# Patient Record
Sex: FEMALE | Race: WHITE | NOT HISPANIC OR LATINO
[De-identification: names, ages, dates, MRNs, and addresses within clinical notes are randomized per-mention and may not be internally consistent; named-entity substitution may affect disease eponyms.]

---

## 2019-06-20 PROBLEM — Z00.00 ENCOUNTER FOR PREVENTIVE HEALTH EXAMINATION: Status: ACTIVE | Noted: 2019-06-20

## 2019-07-30 ENCOUNTER — APPOINTMENT (OUTPATIENT)
Dept: ENDOCRINOLOGY | Facility: CLINIC | Age: 49
End: 2019-07-30
Payer: COMMERCIAL

## 2019-07-30 VITALS
HEIGHT: 65 IN | BODY MASS INDEX: 25.83 KG/M2 | WEIGHT: 155 LBS | HEART RATE: 81 BPM | DIASTOLIC BLOOD PRESSURE: 80 MMHG | SYSTOLIC BLOOD PRESSURE: 104 MMHG

## 2019-07-30 DIAGNOSIS — Z86.39 PERSONAL HISTORY OF OTHER ENDOCRINE, NUTRITIONAL AND METABOLIC DISEASE: ICD-10-CM

## 2019-07-30 DIAGNOSIS — Z83.49 FAMILY HISTORY OF OTHER ENDOCRINE, NUTRITIONAL AND METABOLIC DISEASES: ICD-10-CM

## 2019-07-30 DIAGNOSIS — Z78.9 OTHER SPECIFIED HEALTH STATUS: ICD-10-CM

## 2019-07-30 DIAGNOSIS — E05.20 THYROTOXICOSIS WITH TOXIC MULTINODULAR GOITER W/OUT THYROTOXIC CRISIS OR STORM: ICD-10-CM

## 2019-07-30 PROCEDURE — 99204 OFFICE O/P NEW MOD 45 MIN: CPT

## 2019-07-30 NOTE — PHYSICAL EXAM
[Well Nourished] : well nourished [No Proptosis] : no proptosis [EOMI] : extra ocular movement intact [Well Developed] : well developed [No Accessory Muscle Use] : no accessory muscle use [Clear to Auscultation] : lungs were clear to auscultation bilaterally [Normal S1, S2] : normal S1 and S2 [Regular Rhythm] : with a regular rhythm [No Clubbing, Cyanosis] : no clubbing  or cyanosis of the fingernails [No Motor Deficits] : the motor exam was normal [Normal Strength/Tone] : muscle strength and tone were normal [No Tremors] : no tremors [Normal Mood] : the mood was normal [Normal Affect] : the affect was normal [de-identified] : bilateral homogenous thyroid enlargement, not tender [de-identified] : warm and moist

## 2019-07-30 NOTE — HISTORY OF PRESENT ILLNESS
[FreeTextEntry1] : Self referred for thyroid evaluation.\par History of thyroid enlargement; seen previously by two other endocrinologists (Dr. Elizabeth, Dr. Souza) and also recently seen by surgeon at [facility] (Dr. Gao). According to pt. she has bilateral large thyroid nodules, and FNA done recently negative. She complaints of throat discomfort with motion and local pressure; no dysphagia or dyspnea. Thyroid levels in the past have revealed subclinical hyperthyroidism; thyroid stimulating immunoglobulins negative. A nuclear scan revealed high uptake bilaterally.\par She has been previously advised to have either nothing done, or ROBLEDO, or surgery; she desires another opinion about surgery and about the extent of necessary surgery.\par Labs from 4/19/19:\par free T4 1.3\par T3 111\par TSH .06\par Thyroid antibodies negative

## 2019-07-30 NOTE — REVIEW OF SYSTEMS
[Hair Loss] : hair loss [All other systems negative] : All other systems negative [Insomnia] : insomnia

## 2019-07-30 NOTE — ASSESSMENT
[FreeTextEntry1] : Multinodular thyroid with autonomy, and local discomfort. Record review needed, in particular results of ultrasound and FNA. Records requested. Pt. is likely a surgical case, and in view of bilateral nodularity (if confirmed) and bilateral hyperfunction will probably require near total thyroidectomy.\par At present there is not a need for treatment with anti-thyroid drugs.\par Discussed surgical risks and likelihood of permanent treatment with thyroid hormone.\par Pt. wishes a second opinion from a surgeon; referred to Dr. Marie.

## 2019-09-12 ENCOUNTER — APPOINTMENT (OUTPATIENT)
Dept: SURGERY | Facility: CLINIC | Age: 49
End: 2019-09-12
Payer: COMMERCIAL

## 2019-09-12 VITALS
BODY MASS INDEX: 24.99 KG/M2 | SYSTOLIC BLOOD PRESSURE: 128 MMHG | DIASTOLIC BLOOD PRESSURE: 93 MMHG | HEART RATE: 83 BPM | WEIGHT: 150 LBS | HEIGHT: 65 IN

## 2019-09-12 PROCEDURE — 99243 OFF/OP CNSLTJ NEW/EST LOW 30: CPT | Mod: 25

## 2019-09-12 PROCEDURE — 31575 DIAGNOSTIC LARYNGOSCOPY: CPT

## 2019-09-13 RX ORDER — ESTRADIOL 0.5 MG/1
TABLET ORAL
Refills: 0 | Status: ACTIVE | COMMUNITY

## 2019-09-13 NOTE — CONSULT LETTER
[Dear  ___] : Dear  [unfilled], [Consult Letter:] : I had the pleasure of evaluating your patient, [unfilled]. [Consult Closing:] : Thank you very much for allowing me to participate in the care of this patient.  If you have any questions, please do not hesitate to contact me. [Please see my note below.] : Please see my note below. [Sincerely,] : Sincerely, [FreeTextEntry2] : Dr. Austin Kaplan, Dr. Toño Brown [DrPadmini  ___] : Dr. TITUS [FreeTextEntry3] : Raz Marie MD, FACS\par System Director, Endocrine Surgery\par [facility]\par

## 2019-09-13 NOTE — ASSESSMENT
[FreeTextEntry1] : lengthy discussion regarding options for management including active surveillance vs total thyroidectomy. risks, benefits and alternatives discussed at length. wishes to observe at this time.  sonogram requested. to call next week for results.

## 2019-09-13 NOTE — PHYSICAL EXAM
[de-identified] : 3 cm right and left thyroid nodules, well circumscribed and mobile [Nasal Endoscopy Performed] : nasal endoscopy was performed, see procedure section for findings [Laryngoscopy Performed] : laryngoscopy was performed, see procedure section for findings [Midline] : located in midline position [FreeTextEntry1] : resting pulse 72 [Normal] : orientation to person, place, and time: normal [de-identified] : fiberoptic laryngoscopy shows normal vocal cord mobility bilaterally with no lesions noted

## 2019-09-13 NOTE — HISTORY OF PRESENT ILLNESS
[de-identified] : history of multiple thyroid nodules. prior history of palpitations which has resolved. notes some shortness  of breath with arm elevation.  denies dysphagia, hoarseness or RT exposure. sonogram shows multiple bilateral thyroid nodules up to 4 cm right and 2.7 cm left.  FNA right and left nodules ([facility]) benign.  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

## 2019-10-17 ENCOUNTER — OTHER (OUTPATIENT)
Age: 49
End: 2019-10-17

## 2020-03-24 ENCOUNTER — APPOINTMENT (OUTPATIENT)
Dept: SURGERY | Facility: CLINIC | Age: 50
End: 2020-03-24

## 2024-04-29 ENCOUNTER — NON-APPOINTMENT (OUTPATIENT)
Age: 54
End: 2024-04-29

## 2024-05-14 LAB — TSH SERPL-ACNC: 0.83

## 2024-05-15 ENCOUNTER — APPOINTMENT (OUTPATIENT)
Dept: ENDOCRINOLOGY | Facility: CLINIC | Age: 54
End: 2024-05-15
Payer: COMMERCIAL

## 2024-05-15 VITALS
HEART RATE: 83 BPM | SYSTOLIC BLOOD PRESSURE: 124 MMHG | WEIGHT: 174 LBS | DIASTOLIC BLOOD PRESSURE: 88 MMHG | RESPIRATION RATE: 16 BRPM | HEIGHT: 65 IN | BODY MASS INDEX: 28.99 KG/M2 | OXYGEN SATURATION: 98 %

## 2024-05-15 DIAGNOSIS — E03.9 HYPOTHYROIDISM, UNSPECIFIED: ICD-10-CM

## 2024-05-15 DIAGNOSIS — Z87.898 PERSONAL HISTORY OF OTHER SPECIFIED CONDITIONS: ICD-10-CM

## 2024-05-15 DIAGNOSIS — E04.2 NONTOXIC MULTINODULAR GOITER: ICD-10-CM

## 2024-05-15 DIAGNOSIS — Z81.1 FAMILY HISTORY OF ALCOHOL ABUSE AND DEPENDENCE: ICD-10-CM

## 2024-05-15 LAB
HBA1C MFR BLD HPLC: 5.4
LDLC SERPL DIRECT ASSAY-MCNC: 149
TSH SERPL-ACNC: 0.47

## 2024-05-15 PROCEDURE — 99204 OFFICE O/P NEW MOD 45 MIN: CPT

## 2024-05-15 RX ORDER — LEVOTHYROXINE SODIUM 50 UG/1
50 TABLET ORAL
Refills: 0 | Status: ACTIVE | COMMUNITY

## 2024-05-20 NOTE — ASSESSMENT
[FreeTextEntry1] : NTMNG vs toxic NTMNG  she had an uptake scan with toxic goiter and uptake 47% but she claims her tfts they ve always been normal. she takes lt4 50 mcg qd  she complains of palpitations  stop Lt4 and repeat tfts in 2 mos. mother with nodular goiter  no family h/o thyroid cancer, no neck XRT no dysphagia, no dysphonia, no neck pain, no voice change had FNA 2019 on both sides with benign results  Tpo ab negative US report shows  3 left sided nodules that were deemed stable compared to 2022. Will repeat US in nov 2024.   HLD ; high TC and high LDL. Cont to monitor.  sister with HLD.  Low B12 : increase supplements.   hyperG ; A1c normal.

## 2024-05-20 NOTE — PHYSICAL EXAM
[Alert] : alert [Well Nourished] : well nourished [No Acute Distress] : no acute distress [Well Developed] : well developed [Normal Sclera/Conjunctiva] : normal sclera/conjunctiva [EOMI] : extra ocular movement intact [No Proptosis] : no proptosis [Normal Oropharynx] : the oropharynx was normal [Thyroid Not Enlarged] : the thyroid was not enlarged [No Thyroid Nodules] : no palpable thyroid nodules [No Respiratory Distress] : no respiratory distress [No Accessory Muscle Use] : no accessory muscle use [Clear to Auscultation] : lungs were clear to auscultation bilaterally [Normal S1, S2] : normal S1 and S2 [Normal Rate] : heart rate was normal [Regular Rhythm] : with a regular rhythm [No Edema] : no peripheral edema [Pedal Pulses Normal] : the pedal pulses are present [Normal Bowel Sounds] : normal bowel sounds [Not Tender] : non-tender [Not Distended] : not distended [Soft] : abdomen soft [Normal Anterior Cervical Nodes] : no anterior cervical lymphadenopathy [Normal Posterior Cervical Nodes] : no posterior cervical lymphadenopathy [No Stigmata of Cushings Syndrome] : no stigmata of Cushings Syndrome [Normal Gait] : normal gait [No Rash] : no rash [No Tremors] : no tremors [Oriented x3] : oriented to person, place, and time [Acanthosis Nigricans] : no acanthosis nigricans

## 2024-05-20 NOTE — HISTORY OF PRESENT ILLNESS
[FreeTextEntry1] : quality: NTMNG  onset 2020 severity: moderate modifying factors: diet helps and exercise associated factors: HLD

## 2024-08-01 ENCOUNTER — APPOINTMENT (OUTPATIENT)
Dept: SURGERY | Facility: CLINIC | Age: 54
End: 2024-08-01
Payer: COMMERCIAL

## 2024-08-01 VITALS
WEIGHT: 170 LBS | DIASTOLIC BLOOD PRESSURE: 89 MMHG | HEART RATE: 79 BPM | SYSTOLIC BLOOD PRESSURE: 136 MMHG | BODY MASS INDEX: 28.32 KG/M2 | HEIGHT: 65 IN

## 2024-08-01 PROCEDURE — 36415 COLL VENOUS BLD VENIPUNCTURE: CPT

## 2024-08-01 PROCEDURE — 31575 DIAGNOSTIC LARYNGOSCOPY: CPT

## 2024-08-01 PROCEDURE — 99204 OFFICE O/P NEW MOD 45 MIN: CPT | Mod: 25

## 2024-08-04 LAB
T3 SERPL-MCNC: 95 NG/DL
T4 FREE SERPL-MCNC: 1.1 NG/DL
THYROGLOB AB SERPL-ACNC: <20 IU/ML
THYROPEROXIDASE AB SERPL IA-ACNC: <10 IU/ML
TSH SERPL-ACNC: 0.93 UIU/ML

## 2024-08-04 NOTE — PHYSICAL EXAM
[de-identified] : diffuse thyroid thickening [Nasal Endoscopy Performed] : nasal endoscopy was performed, see procedure section for findings [Laryngoscopy Performed] : laryngoscopy was performed, see procedure section for findings [Midline] : located in midline position [Normal] : orientation to person, place, and time: normal [de-identified] : fiberoptic laryngoscopy shows normal vocal cord mobility bilaterally with no lesions noted; postcricoid edema noted

## 2024-08-04 NOTE — ASSESSMENT
[FreeTextEntry1] : bloods drawn.  sonogram requested. to call next week for results. recommended evaluation by ENT for treatment of postcricoid edema. patient has been given the opportunity to ask questions, and all of the patient's questions have been answered to their satisfaction

## 2024-08-04 NOTE — HISTORY OF PRESENT ILLNESS
[de-identified] : Pt c/o thyroid nodule and occasional choking sensation. denies hoarseness, SOB or RT exposure sonogram: Right 6 mm, left 1.6 cm, 2.2 cm and 1.3 cm thyroid nodules normal TFTs, Ca 9.1 FNA: benign I have reviewed all old and new data and available images.  Additional information was obtained from others present at the time of visit to ensure the completeness of the history

## 2024-08-04 NOTE — CONSULT LETTER
[Dear  ___] : Dear  [unfilled], [Consult Letter:] : I had the pleasure of evaluating your patient, [unfilled]. [Please see my note below.] : Please see my note below. [Consult Closing:] : Thank you very much for allowing me to participate in the care of this patient.  If you have any questions, please do not hesitate to contact me. [Sincerely,] : Sincerely, [FreeTextEntry2] : Dr. Lora Bustamante, Dr. Toño Brown [FreeTextEntry3] : Raz Marie MD, FACS System Director, Endocrine Surgery [facility] Associate  Professor of Surgery [facility] [employer] at [facility] [DrPadmini  ___] : Dr. TITUS

## 2024-08-04 NOTE — PHYSICAL EXAM
[de-identified] : diffuse thyroid thickening [Nasal Endoscopy Performed] : nasal endoscopy was performed, see procedure section for findings [Laryngoscopy Performed] : laryngoscopy was performed, see procedure section for findings [Midline] : located in midline position [Normal] : orientation to person, place, and time: normal [de-identified] : fiberoptic laryngoscopy shows normal vocal cord mobility bilaterally with no lesions noted; postcricoid edema noted

## 2024-08-04 NOTE — CONSULT LETTER
[Dear  ___] : Dear  [unfilled], [Consult Letter:] : I had the pleasure of evaluating your patient, [unfilled]. [Please see my note below.] : Please see my note below. [Consult Closing:] : Thank you very much for allowing me to participate in the care of this patient.  If you have any questions, please do not hesitate to contact me. [Sincerely,] : Sincerely, [FreeTextEntry2] : Dr. Lora Bustamante, Dr. Toño Brown [FreeTextEntry3] : Raz Marie MD, FACS System Director, Endocrine Surgery [facility] Associate  Professor of Surgery [facility] [facility] [employer] at [facility] [DrPadmini  ___] : Dr. TITUS

## 2024-08-04 NOTE — HISTORY OF PRESENT ILLNESS
[de-identified] : Pt c/o thyroid nodule and occasional choking sensation. denies hoarseness, SOB or RT exposure sonogram: Right 6 mm, left 1.6 cm, 2.2 cm and 1.3 cm thyroid nodules normal TFTs, Ca 9.1 FNA: benign I have reviewed all old and new data and available images.  Additional information was obtained from others present at the time of visit to ensure the completeness of the history

## 2024-08-05 ENCOUNTER — APPOINTMENT (OUTPATIENT)
Dept: ENDOCRINOLOGY | Facility: CLINIC | Age: 54
End: 2024-08-05

## 2024-08-05 PROBLEM — E53.8 B12 DEFICIENCY: Status: ACTIVE | Noted: 2024-08-05

## 2024-08-05 PROCEDURE — 99214 OFFICE O/P EST MOD 30 MIN: CPT

## 2024-08-05 NOTE — PHYSICAL EXAM
[Alert] : alert [Well Nourished] : well nourished [No Acute Distress] : no acute distress [Well Developed] : well developed [Normal Sclera/Conjunctiva] : normal sclera/conjunctiva [EOMI] : extra ocular movement intact [No Proptosis] : no proptosis [Normal Oropharynx] : the oropharynx was normal [Thyroid Not Enlarged] : the thyroid was not enlarged [No Thyroid Nodules] : no palpable thyroid nodules [No Respiratory Distress] : no respiratory distress [No Accessory Muscle Use] : no accessory muscle use [Clear to Auscultation] : lungs were clear to auscultation bilaterally [Normal S1, S2] : normal S1 and S2 [Normal Rate] : heart rate was normal [Regular Rhythm] : with a regular rhythm [No Edema] : no peripheral edema [Pedal Pulses Normal] : the pedal pulses are present [Normal Bowel Sounds] : normal bowel sounds [Not Tender] : non-tender [Not Distended] : not distended [Soft] : abdomen soft [Normal Anterior Cervical Nodes] : no anterior cervical lymphadenopathy [No Stigmata of Cushings Syndrome] : no stigmata of Cushings Syndrome [Normal Gait] : normal gait [No Rash] : no rash [No Tremors] : no tremors [Oriented x3] : oriented to person, place, and time [Acanthosis Nigricans] : no acanthosis nigricans

## 2024-08-05 NOTE — ASSESSMENT
[FreeTextEntry1] :  NTMNG vs toxic NTMNG  she had an uptake scan with toxic goiter and uptake 47% , claims her tfts they 've always been normal. mother with nodular goiter  she was started on Lt4 but we stopped it  some times feel choking  had FNA 2019 on both sides with benign results  Tpo ab negative,  TRAb negative.  check TSI  US report shows  3 left sided nodules that were deemed stable compared to 2022. Will repeat US now with Dr Marie   HLD ;  Cont to monitor. sister with HLD.  Low B12 : cont  supplements.   hyperG ; A1c normal.

## 2024-08-06 ENCOUNTER — APPOINTMENT (OUTPATIENT)
Dept: ULTRASOUND IMAGING | Facility: CLINIC | Age: 54
End: 2024-08-06

## 2024-08-06 PROCEDURE — 76536 US EXAM OF HEAD AND NECK: CPT

## 2024-08-13 DIAGNOSIS — E04.2 NONTOXIC MULTINODULAR GOITER: ICD-10-CM

## 2025-02-05 ENCOUNTER — APPOINTMENT (OUTPATIENT)
Dept: ENDOCRINOLOGY | Facility: CLINIC | Age: 55
End: 2025-02-05

## 2025-02-13 ENCOUNTER — APPOINTMENT (OUTPATIENT)
Dept: SURGERY | Facility: CLINIC | Age: 55
End: 2025-02-13
Payer: COMMERCIAL

## 2025-02-13 DIAGNOSIS — E04.2 NONTOXIC MULTINODULAR GOITER: ICD-10-CM

## 2025-02-13 PROCEDURE — 99214 OFFICE O/P EST MOD 30 MIN: CPT

## 2025-02-13 PROCEDURE — 36415 COLL VENOUS BLD VENIPUNCTURE: CPT

## 2025-02-15 LAB
25(OH)D3 SERPL-MCNC: 39.4 NG/ML
CALCIUM SERPL-MCNC: 9.1 MG/DL
CALCIUM SERPL-MCNC: 9.1 MG/DL
PARATHYROID HORMONE INTACT: 89 PG/ML
T3 SERPL-MCNC: 100 NG/DL
T4 FREE SERPL-MCNC: 1.1 NG/DL
THYROGLOB AB SERPL-ACNC: 15.5 IU/ML
THYROPEROXIDASE AB SERPL IA-ACNC: 9.8 IU/ML
TSH SERPL-ACNC: 1.15 UIU/ML

## 2025-07-10 ENCOUNTER — APPOINTMENT (OUTPATIENT)
Dept: ULTRASOUND IMAGING | Facility: CLINIC | Age: 55
End: 2025-07-10
Payer: COMMERCIAL

## 2025-07-10 PROCEDURE — 76536 US EXAM OF HEAD AND NECK: CPT
